# Patient Record
Sex: MALE | Race: WHITE
[De-identification: names, ages, dates, MRNs, and addresses within clinical notes are randomized per-mention and may not be internally consistent; named-entity substitution may affect disease eponyms.]

---
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
## 2019-03-28 ENCOUNTER — HOSPITAL ENCOUNTER (EMERGENCY)
Dept: HOSPITAL 82 - ED | Age: 19
LOS: 1 days | Discharge: HOME | End: 2019-03-29
Payer: COMMERCIAL

## 2019-03-28 VITALS — WEIGHT: 121.25 LBS | BODY MASS INDEX: 20.2 KG/M2 | HEIGHT: 65 IN

## 2019-03-28 DIAGNOSIS — R10.12: ICD-10-CM

## 2019-03-28 DIAGNOSIS — S50.811A: ICD-10-CM

## 2019-03-28 DIAGNOSIS — S20.312A: Primary | ICD-10-CM

## 2019-03-28 DIAGNOSIS — S20.311A: ICD-10-CM

## 2019-03-28 DIAGNOSIS — F17.200: ICD-10-CM
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
## 2019-03-28 LAB
BASOPHILS NFR BLD AUTO: 1 % (ref 0–3)
EOSINOPHIL NFR BLD AUTO: 2 % (ref 0–8)
ERYTHROCYTE [DISTWIDTH] IN BLOOD BY AUTOMATED COUNT: 12.4 % (ref 11.5–15.5)
HCT VFR BLD AUTO: 45.8 % (ref 39–50)
HGB BLD-MCNC: 16.1 G/DL (ref 14–18)
IMM GRANULOCYTES NFR BLD: 0.4 % (ref 0–5)
LYMPHOCYTES NFR BLD: 31 % (ref 15–41)
MCH RBC QN AUTO: 30 PG  CALC (ref 26–32)
MCHC RBC AUTO-ENTMCNC: 35.2 G/L CALC (ref 32–36)
MCV RBC AUTO: 85.3 FL  CALC (ref 80–100)
MONOCYTES NFR BLD AUTO: 5 % (ref 2–13)
NEUTROPHILS # BLD AUTO: 6.33 THOU/UL (ref 1.82–7.42)
NEUTROPHILS NFR BLD AUTO: 62 % (ref 42–76)
PLATELET # BLD AUTO: 236 THOU/UL (ref 130–400)
RBC # BLD AUTO: 5.37 MILL/UL (ref 4.7–6.1)

## 2019-03-29 VITALS — DIASTOLIC BLOOD PRESSURE: 63 MMHG | SYSTOLIC BLOOD PRESSURE: 117 MMHG

## 2019-03-29 LAB
ALBUMIN SERPL-MCNC: 5.2 G/DL (ref 3.2–5)
ALP SERPL-CCNC: 89 U/L (ref 38–126)
ANION GAP SERPL CALCULATED.3IONS-SCNC: 16 MMOL/L
AST SERPL-CCNC: 23 U/L (ref 17–59)
BARBITURATES UR-MCNC: NEGATIVE UG/ML
BILIRUB UR QL STRIP.AUTO: NEGATIVE
BUN SERPL-MCNC: 14 MG/DL (ref 8–21)
BUN/CREAT SERPL: 16
CHLORIDE SERPL-SCNC: 102 MMOL/L (ref 95–108)
CK SERPL-CCNC: 117 U/L (ref 52–200)
CO2 SERPL-SCNC: 28 MMOL/L (ref 22–30)
COCAINE UR-MCNC: NEGATIVE NG/ML
COLOR UR AUTO: YELLOW
CREAT SERPL-MCNC: 0.9 MG/DL (ref 0.7–1.3)
ETHANOL SERPL-MCNC: 0 MG/DL (ref 0–30)
GLUCOSE UR STRIP.AUTO-MCNC: NEGATIVE MG/DL
HGB UR QL STRIP.AUTO: NEGATIVE
KETONES UR STRIP.AUTO-MCNC: NEGATIVE MG/DL
LEUKOCYTE ESTERASE UR QL STRIP.AUTO: NEGATIVE
METHADONE SERPL-MCNC: NEGATIVE NG/ML
NITRITE UR QL STRIP.AUTO: NEGATIVE
OXCYCODONE: NEGATIVE
PH UR STRIP.AUTO: 5.5 [PH] (ref 4.5–8)
POTASSIUM SERPL-SCNC: 3.6 MMOL/L (ref 3.5–5.1)
PROT SERPL-MCNC: 7.9 G/DL (ref 6.3–8.2)
PROT UR QL STRIP.AUTO: (no result) MG/DL
SODIUM SERPL-SCNC: 142 MMOL/L (ref 137–146)
SP GR UR STRIP.AUTO: 1.02
TETRAHYDROCANNABIONOL: POSITIVE
TRICYLIC ANTIDEPRESSANTS: NEGATIVE
UROBILINOGEN UR QL STRIP.AUTO: 1 E.U./DL

## 2019-07-06 ENCOUNTER — HOSPITAL ENCOUNTER (EMERGENCY)
Dept: HOSPITAL 82 - ED | Age: 19
Discharge: HOME | End: 2019-07-06
Payer: COMMERCIAL

## 2019-07-06 VITALS — HEIGHT: 65 IN | BODY MASS INDEX: 21.71 KG/M2 | WEIGHT: 130.29 LBS

## 2019-07-06 VITALS — DIASTOLIC BLOOD PRESSURE: 50 MMHG | SYSTOLIC BLOOD PRESSURE: 106 MMHG
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
## 2019-07-06 DIAGNOSIS — F17.210: ICD-10-CM

## 2019-07-06 DIAGNOSIS — S50.811A: Primary | ICD-10-CM

## 2019-11-28 ENCOUNTER — HOSPITAL ENCOUNTER (EMERGENCY)
Dept: HOSPITAL 82 - ED | Age: 19
Discharge: HOME | End: 2019-11-28
Payer: COMMERCIAL

## 2019-11-28 VITALS — SYSTOLIC BLOOD PRESSURE: 123 MMHG | DIASTOLIC BLOOD PRESSURE: 63 MMHG

## 2019-11-28 VITALS — HEIGHT: 65 IN | BODY MASS INDEX: 19.83 KG/M2 | WEIGHT: 119.05 LBS

## 2019-11-28 DIAGNOSIS — Z20.828: ICD-10-CM

## 2019-11-28 DIAGNOSIS — J06.9: Primary | ICD-10-CM

## 2020-01-26 ENCOUNTER — HOSPITAL ENCOUNTER (EMERGENCY)
Age: 20
Discharge: HOME | End: 2020-01-26
Payer: COMMERCIAL

## 2020-01-26 DIAGNOSIS — S16.1XXA: Primary | ICD-10-CM
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
## 2020-01-26 DIAGNOSIS — F17.210: ICD-10-CM

## 2020-01-26 LAB
BARBITURATES UR-MCNC: NEGATIVE UG/ML
BILIRUB UR QL STRIP.AUTO: NEGATIVE
CLARITY UR: CLEAR
COCAINE UR-MCNC: NEGATIVE NG/ML
COLOR UR AUTO: YELLOW
GLUCOSE UR STRIP.AUTO-MCNC: NEGATIVE MG/DL
HGB UR QL STRIP.AUTO: NEGATIVE
KETONES UR STRIP.AUTO-MCNC: NEGATIVE MG/DL
LEUKOCYTE ESTERASE UR QL STRIP.AUTO: NEGATIVE
METHADONE SERPL-MCNC: NEGATIVE NG/ML
NITRITE UR QL STRIP.AUTO: NEGATIVE
OXCYCODONE: NEGATIVE
PH UR STRIP.AUTO: 6.5 [PH] (ref 4.5–8)
PROT UR STRIP.AUTO-MCNC: NEGATIVE MG/DL
SP GR UR STRIP.AUTO: <=1.005
TETRAHYDROCANNABIONOL: POSITIVE
TRICYLIC ANTIDEPRESSANTS: NEGATIVE
UROBILINOGEN UR QL STRIP.AUTO: 0.2 E.U./DL

## 2021-03-11 ENCOUNTER — HOSPITAL ENCOUNTER (EMERGENCY)
Dept: HOSPITAL 82 - ED | Age: 21
Discharge: LEFT BEFORE DISCHARGE | DRG: 951 | End: 2021-03-11
Payer: COMMERCIAL

## 2021-03-11 DIAGNOSIS — Z53.21: Primary | ICD-10-CM

## 2025-01-19 ENCOUNTER — HOSPITAL ENCOUNTER (OUTPATIENT)
Dept: HOSPITAL 82 - ED | Age: 25
Setting detail: OBSERVATION
LOS: 1 days | Discharge: HOME | End: 2025-01-20
Payer: COMMERCIAL

## 2025-01-19 VITALS — DIASTOLIC BLOOD PRESSURE: 62 MMHG | SYSTOLIC BLOOD PRESSURE: 113 MMHG

## 2025-01-19 VITALS — SYSTOLIC BLOOD PRESSURE: 114 MMHG | DIASTOLIC BLOOD PRESSURE: 53 MMHG

## 2025-01-19 VITALS — HEIGHT: 65 IN | WEIGHT: 124.78 LBS | BODY MASS INDEX: 20.79 KG/M2

## 2025-01-19 VITALS — SYSTOLIC BLOOD PRESSURE: 130 MMHG | DIASTOLIC BLOOD PRESSURE: 58 MMHG

## 2025-01-19 VITALS — DIASTOLIC BLOOD PRESSURE: 74 MMHG | SYSTOLIC BLOOD PRESSURE: 119 MMHG

## 2025-01-19 VITALS — SYSTOLIC BLOOD PRESSURE: 125 MMHG | DIASTOLIC BLOOD PRESSURE: 70 MMHG

## 2025-01-19 VITALS — DIASTOLIC BLOOD PRESSURE: 61 MMHG | SYSTOLIC BLOOD PRESSURE: 126 MMHG

## 2025-01-19 VITALS — DIASTOLIC BLOOD PRESSURE: 57 MMHG | SYSTOLIC BLOOD PRESSURE: 112 MMHG

## 2025-01-19 DIAGNOSIS — F10.90: ICD-10-CM

## 2025-01-19 DIAGNOSIS — F31.9: ICD-10-CM

## 2025-01-19 DIAGNOSIS — Z79.899: ICD-10-CM

## 2025-01-19 DIAGNOSIS — R74.8: ICD-10-CM

## 2025-01-19 DIAGNOSIS — E86.0: Primary | ICD-10-CM

## 2025-01-19 DIAGNOSIS — F90.9: ICD-10-CM

## 2025-01-19 DIAGNOSIS — R11.2: ICD-10-CM

## 2025-01-19 DIAGNOSIS — R74.02: ICD-10-CM

## 2025-01-19 DIAGNOSIS — F12.90: ICD-10-CM

## 2025-01-19 DIAGNOSIS — F17.200: ICD-10-CM

## 2025-01-19 LAB
ALBUMIN SERPL-MCNC: 5.7 G/DL (ref 3.2–5)
ALP SERPL-CCNC: 89 U/L (ref 38–126)
ANION GAP SERPL CALCULATED.3IONS-SCNC: 33 MMOL/L
AST SERPL-CCNC: 74 U/L (ref 17–59)
BASOPHILS NFR BLD MANUAL: 1 % (ref 0–3)
BUN SERPL-MCNC: 16 MG/DL (ref 9–20)
BUN/CREAT SERPL: 16
CHLORIDE SERPL-SCNC: 106 MMOL/L (ref 95–108)
CO2 SERPL-SCNC: 8 MMOL/L (ref 22–30)
CREAT SERPL-MCNC: 1 MG/DL (ref 0.7–1.3)
ERYTHROCYTE [DISTWIDTH] IN BLOOD BY AUTOMATED COUNT: 12.7 % (ref 11.5–15.5)
ETHANOL SERPL-MCNC: 12 MG/DL (ref 0–30)
HCT VFR BLD AUTO: 46.4 % (ref 39–50)
HGB BLD-MCNC: 16.2 G/DL (ref 14–18)
IMM GRANULOCYTES NFR BLD: 0.8 % (ref 0–5)
KETONES UR STRIP.AUTO-MCNC: >=160 MG/DL
LIPASE SERPL-CCNC: 39 U/L (ref 23–300)
LYMPHOCYTES NFR BLD MANUAL: 7 % (ref 15–41)
MANUAL DIFF BLD: YES
MCH RBC QN AUTO: 29.8 PG  CALC (ref 26–32)
MCHC RBC AUTO-ENTMCNC: 34.9 G/DL CAL (ref 32–36)
MCV RBC AUTO: 85.5 FL  CALC (ref 80–100)
MONOCYTES NFR BLD MANUAL: 4 % (ref 2–13)
NEUTS SEG NFR BLD MANUAL: 88 % (ref 42–76)
PH UR STRIP.AUTO: 5 [PH] (ref 4.5–8)
PLATELET # BLD AUTO: 349 THOU/UL (ref 130–400)
POTASSIUM SERPL-SCNC: 5 MMOL/L (ref 3.5–5.1)
PROT SERPL-MCNC: 8.8 G/DL (ref 6.3–8.2)
RBC # BLD AUTO: 5.43 MILL/UL (ref 4.7–6.1)
SODIUM SERPL-SCNC: 142 MMOL/L (ref 137–146)
SP GR UR STRIP.AUTO: >=1.03
UROBILINOGEN UR QL STRIP.AUTO: 0.2 E.U./DL

## 2025-01-19 PROCEDURE — G0378 HOSPITAL OBSERVATION PER HR: HCPCS

## 2025-01-19 NOTE — NUR
patient states he takes no home meds with exception of medical marijuana 6
times a day, which is prescribed for ADHD, Bipolar disorder.

## 2025-01-19 NOTE — NUR
PT'S CONDITION REMAINS THE SAME. WIFE IS AT BEDSIDE. ALL NATIONAL PATIENT
SAFETY PRECAUTIONS IN PLACE.

## 2025-01-19 NOTE — NUR
S:  NACHO HADDAD is a 24 M who presents with
leukocytosis.
 
O:
 
VS: /57, P 92, RR 18,T 98.0
 
W 56.6 kg, HT 65 in, Scr=1.0,CrCl= 90 ml/min
 
A:
 
Blood culture is pending.
 
P:
 
Patient is on Cefepime 2gm IV q12h.
 
Vancomycin ordered for pharmacy to dose.
 
Start Vancomycin 750mg IV Q12H. Vancomycin trough is drawn before the 4th dose
on 1/21/25 0030.
 
Vancomycin goal trough is between <10-20 mcg/ml>.
 
Pharmacy will follow and or advise on antibiotics use as needed.

## 2025-01-19 NOTE — NUR
PT RESTING IN BED WITH FAMILY MEMBER AT BEDSIDE. DENIES ANY PAIN. NAUSE OR
VOMIT AT TIME. PT IS ALERT AND ORIENTED X4. RESPS ARE EVEN AND UNLABORED AT
THIS TIME. NO DISTRESS NOTEDD. LUNGS CLEAR TO AUSCULTATION. CIWA ASSESSMENT
DONE AT THIS TIME. LR INFUSING THROUGH HIS LEFT FOREARM AT THIS TIME. 20 G IV
LEFT FOREARM WITH NO SIGNS OF ANY ABNORMALITIES. ENCOURAGED TO CALL FOR
ASSISTANCE IF NEEDED. CALL LIGHT IN REACH AND SAFETY PRECAUTIONS IN PLACE.

## 2025-01-19 NOTE — NUR
PT IS A&O X4; NO TELE; LUNG SOUNDS ARE CLEAR. PT CAN MOVE ALL EXTREMITES.
PLAN OF CARE WAS REVIEWED. PT MAKES HIS NEEDS KNOWN. PT STATES NO FURTHER
QUESTIONS AT THIS TIME. ALL NATIONAL PATEINT SAFETY PRECAUTIONS IN PLACE.
 
SAFETY ALERT:
PT IS A [employer].
FAMILY AUTHORIZED TO VISIT IS LAZARO [employer]NANDE[employer] AND [employer]
CODE IS [id number]

## 2025-01-20 VITALS — DIASTOLIC BLOOD PRESSURE: 60 MMHG | SYSTOLIC BLOOD PRESSURE: 115 MMHG

## 2025-01-20 VITALS — SYSTOLIC BLOOD PRESSURE: 129 MMHG | DIASTOLIC BLOOD PRESSURE: 68 MMHG

## 2025-01-20 LAB
ALBUMIN SERPL-MCNC: 3.4 G/DL (ref 3.2–5)
ALP SERPL-CCNC: 52 U/L (ref 38–126)
ANION GAP SERPL CALCULATED.3IONS-SCNC: 6 MMOL/L
AST SERPL-CCNC: 28 U/L (ref 17–59)
BASOPHILS NFR BLD AUTO: 0.2 % (ref 0–3)
BUN SERPL-MCNC: 15 MG/DL (ref 9–20)
BUN/CREAT SERPL: 19
CHLORIDE SERPL-SCNC: 109 MMOL/L (ref 95–108)
CO2 SERPL-SCNC: 26 MMOL/L (ref 22–30)
CREAT SERPL-MCNC: 0.8 MG/DL (ref 0.7–1.3)
EOSINOPHIL NFR BLD AUTO: 1.1 % (ref 0–8)
ERYTHROCYTE [DISTWIDTH] IN BLOOD BY AUTOMATED COUNT: 13.1 % (ref 11.5–15.5)
HCT VFR BLD AUTO: 36.2 % (ref 39–50)
HGB BLD-MCNC: 12.9 G/DL (ref 14–18)
IMM GRANULOCYTES NFR BLD: 0.1 % (ref 0–5)
LYMPHOCYTES NFR BLD: 28.7 % (ref 15–41)
MAGNESIUM SERPL-MCNC: 2.1 MG/DL (ref 1.6–2.3)
MCH RBC QN AUTO: 30.9 PG  CALC (ref 26–32)
MCHC RBC AUTO-ENTMCNC: 35.6 G/DL CAL (ref 32–36)
MCV RBC AUTO: 86.8 FL  CALC (ref 80–100)
MONOCYTES NFR BLD AUTO: 4.6 % (ref 2–13)
NEUTROPHILS # BLD AUTO: 5.85 THOU/UL (ref 1.82–7.42)
NEUTROPHILS NFR BLD AUTO: 65.3 % (ref 42–76)
PLATELET # BLD AUTO: 189 THOU/UL (ref 130–400)
POTASSIUM SERPL-SCNC: 4 MMOL/L (ref 3.5–5.1)
PROT SERPL-MCNC: 5.8 G/DL (ref 6.3–8.2)
RBC # BLD AUTO: 4.17 MILL/UL (ref 4.7–6.1)
SODIUM SERPL-SCNC: 137 MMOL/L (ref 137–146)

## 2025-01-20 NOTE — NUR
PT RESTING IN BED IN SUPINE POSITION WITH EYES CLOSED, FAMILY MEMBER REMAINS
AT BEDSIDE. RESPS ARE EVEN AND UNLABORED. CALL LIGHT IN REACH AND SAFETY
PRECAUTIONS IN PLACE.

## 2025-01-20 NOTE — NUR
Discharge instructions given. Patient verbalizes understanding of same.
Discharged in stable condition via Ambulatory to Home with
staff. All belongings sent with pt. PATIENT IV REMOVED, PATIENT TOLERATED
WELL.

## 2025-01-20 NOTE — NUR
PT RESTING IN BED WITH EYES CLOSED IJ SUPINE POSITION. NO DISTRESS NOTED. CALL
LIGHT IN REACH AND SAFETY PRECAUTION IN PLACE.

## 2025-01-22 NOTE — NUR
Discharge follow up call completed 1/22/25.  Mother states patient is doing
well.  Patient has prescribed medication and is taking as directed.  Patient
does not have a follow up appointment scheduled with PCP at this time but
mother states she will call today to schedule.  No needs or concerns
verbalized by mother at this time.